# Patient Record
Sex: MALE | Race: BLACK OR AFRICAN AMERICAN
[De-identification: names, ages, dates, MRNs, and addresses within clinical notes are randomized per-mention and may not be internally consistent; named-entity substitution may affect disease eponyms.]

---

## 2020-07-29 ENCOUNTER — HOSPITAL ENCOUNTER (EMERGENCY)
Dept: HOSPITAL 8 - ED | Age: 40
Discharge: HOME | End: 2020-07-29
Payer: COMMERCIAL

## 2020-07-29 VITALS — SYSTOLIC BLOOD PRESSURE: 118 MMHG | DIASTOLIC BLOOD PRESSURE: 78 MMHG

## 2020-07-29 VITALS — HEIGHT: 72 IN | WEIGHT: 198.42 LBS | BODY MASS INDEX: 26.87 KG/M2

## 2020-07-29 DIAGNOSIS — B35.3: Primary | ICD-10-CM

## 2020-07-29 DIAGNOSIS — Z88.1: ICD-10-CM

## 2020-07-29 DIAGNOSIS — K62.89: ICD-10-CM

## 2020-07-29 DIAGNOSIS — Z90.5: ICD-10-CM

## 2020-07-29 DIAGNOSIS — L03.116: ICD-10-CM

## 2020-07-29 DIAGNOSIS — Z88.0: ICD-10-CM

## 2020-07-29 DIAGNOSIS — K59.00: ICD-10-CM

## 2020-07-29 DIAGNOSIS — K64.8: ICD-10-CM

## 2020-07-29 PROCEDURE — 99283 EMERGENCY DEPT VISIT LOW MDM: CPT

## 2020-07-29 PROCEDURE — 82962 GLUCOSE BLOOD TEST: CPT

## 2020-07-29 NOTE — NUR
CHARGE RN: PT WALKED BACK FROM LOBBY TO ROOM AT THIS TIME. STEADY UPON 
AMBULATION. NO ACUTE DISTRESS NOTED.